# Patient Record
Sex: FEMALE | Race: WHITE | NOT HISPANIC OR LATINO | Employment: FULL TIME | URBAN - METROPOLITAN AREA
[De-identification: names, ages, dates, MRNs, and addresses within clinical notes are randomized per-mention and may not be internally consistent; named-entity substitution may affect disease eponyms.]

---

## 2018-11-21 ENCOUNTER — PROCEDURE VISIT (OUTPATIENT)
Dept: SURGERY | Facility: CLINIC | Age: 38
End: 2018-11-21

## 2018-11-21 DIAGNOSIS — L81.8 TATTOO OF SKIN: Primary | ICD-10-CM

## 2018-11-21 PROCEDURE — VITMND VITAMIN D: Performed by: SURGERY

## 2018-11-21 RX ORDER — MEDROXYPROGESTERONE ACETATE 150 MG/ML
1 INJECTION, SUSPENSION INTRAMUSCULAR DAILY
Refills: 3 | COMMUNITY
Start: 2018-09-10

## 2018-11-21 RX ORDER — TRAZODONE HYDROCHLORIDE 50 MG/1
1 TABLET ORAL DAILY
Refills: 1 | COMMUNITY
Start: 2018-09-22

## 2018-11-21 RX ORDER — BUPROPION HYDROCHLORIDE 300 MG/1
300 TABLET ORAL DAILY
Refills: 1 | COMMUNITY
Start: 2018-10-15

## 2018-11-21 RX ORDER — ARMODAFINIL 250 MG/1
250 TABLET ORAL EVERY MORNING
Refills: 1 | COMMUNITY
Start: 2018-11-17

## 2018-11-21 RX ORDER — HYDROCORTISONE 5 MG/1
5 TABLET ORAL EVERY MORNING
Refills: 5 | COMMUNITY
Start: 2018-10-22

## 2018-11-21 NOTE — PROGRESS NOTES
Assessment/Plan:    Tattoo of skin  The patient returns for her next picosure laser tattoo treatment for the removal of her right shoulder tattoo  In addition she has been receiving full face treatment with the focus lens array hand piece  Today 1342 pulses were applied at 3 mm with a fluence of 2 83 to the right shoulder tattoo  Next the focused lens array handpiece was used to apply a full face treatment with a total of 3414 pulses applied with the 6 mm hand piece  The patient will follow up in 6 weeks time for her next treatment  She was educated that the device will be moved to the [address] [place] [address] after the new year  Diagnoses and all orders for this visit:    Tattoo of skin    Other orders  -     PROAIR  (90 Base) MCG/ACT inhaler; Inhale 2 puffs every 4 (four) hours as needed  -     Armodafinil 250 MG tablet; Take 250 mg by mouth every morning  -     buPROPion (WELLBUTRIN XL) 300 mg 24 hr tablet; Take 300 mg by mouth daily  -     hydrocortisone (CORTEF) 5 mg tablet; Take 5 mg by mouth every morning  -     medroxyPROGESTERone acetate (DEPO-PROVERA SYRINGE) 150 mg/mL injection; Inject 1 mg into a muscle daily  -     traZODone (DESYREL) 50 mg tablet; Take 1 mg by mouth daily          Subjective:      Patient ID: Shahab Osuna is a 41 y o  female  11-  Patient is here today for a tattoo removal on her right shoulder  Yelena Valle        The following portions of the patient's history were reviewed and updated as appropriate: allergies, current medications, past family history, past medical history, past social history, past surgical history and problem list     Review of Systems   Constitutional: Negative  HENT: Negative  Eyes: Negative  Respiratory: Negative  Cardiovascular: Negative  Gastrointestinal: Negative  Endocrine: Negative  Genitourinary: Negative  Musculoskeletal: Negative  Skin: Negative  Allergic/Immunologic: Negative  Neurological: Negative  Hematological: Negative  Psychiatric/Behavioral: Negative  Objective: There were no vitals taken for this visit  Physical Exam   Constitutional: She is oriented to person, place, and time  She appears well-developed and well-nourished  HENT:   Head: Normocephalic and atraumatic  Eyes: Pupils are equal, round, and reactive to light  Conjunctivae are normal    Neck: Normal range of motion  Neck supple  Cardiovascular: Normal rate and regular rhythm  Pulmonary/Chest: Effort normal and breath sounds normal    Abdominal: Soft  Bowel sounds are normal    Musculoskeletal: Normal range of motion  Neurological: She is alert and oriented to person, place, and time  Skin: Skin is warm and dry     Psychiatric: Her behavior is normal  Judgment and thought content normal

## 2018-11-21 NOTE — ASSESSMENT & PLAN NOTE
The patient returns for her next picosure laser tattoo treatment for the removal of her right shoulder tattoo  In addition she has been receiving full face treatment with the focus lens array hand piece  Today 1342 pulses were applied at 3 mm with a fluence of 2 83 to the right shoulder tattoo  Next the focused lens array handpiece was used to apply a full face treatment with a total of 3414 pulses applied with the 6 mm hand piece  The patient will follow up in 6 weeks time for her next treatment  She was educated that the device will be moved to the [address] [place] [address] after the new year